# Patient Record
(demographics unavailable — no encounter records)

---

## 2024-11-13 NOTE — ASSESSMENT
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 Dr. Edil Amaya given by Hematology- control any side of bleeding during or after procedure.- Patient will have on hand.

## 2024-11-13 NOTE — HISTORY OF PRESENT ILLNESS
[No Pertinent Cardiac History] : no history of aortic stenosis, atrial fibrillation, coronary artery disease, recent myocardial infarction, or implantable device/pacemaker [No Pertinent Pulmonary History] : no history of asthma, COPD, sleep apnea, or smoking [No Adverse Anesthesia Reaction] : no adverse anesthesia reaction in self or family member [(Patient denies any chest pain, claudication, dyspnea on exertion, orthopnea, palpitations or syncope)] : Patient denies any chest pain, claudication, dyspnea on exertion, orthopnea, palpitations or syncope [Aortic Stenosis] : no aortic stenosis [Atrial Fibrillation] : no atrial fibrillation [FreeTextEntry1] : arthroscopy of right ankle [FreeTextEntry2] : 11/21/24 [FreeTextEntry3] : Dr. Flannery [FreeTextEntry4] : Here for preop clearance Good exercise tolerance.

## 2025-02-06 NOTE — PLAN
[FreeTextEntry1] : POCT Flu A over 4 days of symptoms Rec supportive care, plenty of fluids.  Follow up in 1 week if symptoms worse.

## 2025-02-06 NOTE — PHYSICAL EXAM
[No Acute Distress] : no acute distress [Well Nourished] : well nourished [Well Developed] : well developed [Well-Appearing] : well-appearing [Normal Sclera/Conjunctiva] : normal sclera/conjunctiva [PERRL] : pupils equal round and reactive to light [EOMI] : extraocular movements intact [Normal Outer Ear/Nose] : the outer ears and nose were normal in appearance [No JVD] : no jugular venous distention [No Lymphadenopathy] : no lymphadenopathy [Supple] : supple [Thyroid Normal, No Nodules] : the thyroid was normal and there were no nodules present [No Respiratory Distress] : no respiratory distress  [No Accessory Muscle Use] : no accessory muscle use [Clear to Auscultation] : lungs were clear to auscultation bilaterally [Normal Rate] : normal rate  [Regular Rhythm] : with a regular rhythm [Normal S1, S2] : normal S1 and S2 [No Murmur] : no murmur heard [No Carotid Bruits] : no carotid bruits [No Abdominal Bruit] : a ~M bruit was not heard ~T in the abdomen [No Varicosities] : no varicosities [Pedal Pulses Present] : the pedal pulses are present [No Edema] : there was no peripheral edema [No Palpable Aorta] : no palpable aorta [No Extremity Clubbing/Cyanosis] : no extremity clubbing/cyanosis [Soft] : abdomen soft [Non Tender] : non-tender [Non-distended] : non-distended [No Masses] : no abdominal mass palpated [No HSM] : no HSM [Normal Bowel Sounds] : normal bowel sounds [Normal Posterior Cervical Nodes] : no posterior cervical lymphadenopathy [Normal Anterior Cervical Nodes] : no anterior cervical lymphadenopathy [No CVA Tenderness] : no CVA  tenderness [No Spinal Tenderness] : no spinal tenderness [No Joint Swelling] : no joint swelling [Grossly Normal Strength/Tone] : grossly normal strength/tone [No Rash] : no rash [Coordination Grossly Intact] : coordination grossly intact [No Focal Deficits] : no focal deficits [Normal Gait] : normal gait [Deep Tendon Reflexes (DTR)] : deep tendon reflexes were 2+ and symmetric [Normal Affect] : the affect was normal [Normal Insight/Judgement] : insight and judgment were intact [de-identified] : minimal erythema

## 2025-02-06 NOTE — REVIEW OF SYSTEMS
[Fever] : fever [Sore Throat] : sore throat [Cough] : cough [Negative] : Heme/Lymph [Fatigue] : fatigue

## 2025-02-06 NOTE — HISTORY OF PRESENT ILLNESS
[FreeTextEntry8] : 34yr old male seen today for acute visit for 4-5 days of body aches, fever, dry cough. Child in  with similar symptoms, which have now resolved. He has been on OTC decongestants with mild relief. No fever this am. Relatively low appetite.

## 2025-05-01 NOTE — HISTORY OF PRESENT ILLNESS
[de-identified] : Wm Brooks is a 34 year old male with history of rhinoplasty, adenoidectomy, bilateral tympanostomy tube placement and cholesteatoma removal referred by Dr. Polk for evaluation of foreign body of right ear. He states that he has had mild constant right nonpuslsatile tinnitus for the past week. He went to PCP today and was found to have foreign body in right ear. He endorses Q-tip use. He denies hearing loss or vertigo. He denies otalgia or otorrhea. He denies recurrent ear infections or fever. He states grandparents with hearing loss. He denies significant loud noise exposure or ototoxic drug exposure. He denies head trauma.  He denies facial weakness or numbness.

## 2025-05-01 NOTE — CONSULT LETTER
[Dear  ___] : Dear  [unfilled], [Consult Letter:] : I had the pleasure of evaluating your patient, [unfilled]. [Please see my note below.] : Please see my note below. [Consult Closing:] : Thank you very much for allowing me to participate in the care of this patient.  If you have any questions, please do not hesitate to contact me. [Sincerely,] : Sincerely, [FreeTextEntry3] : Russell Reno MD

## 2025-05-01 NOTE — ASSESSMENT
[FreeTextEntry1] : Wm Brooks presents for evaluation of tinnitus of right ear. He has history of tympanostomy tube placement and right mastoidectomy for cholesteatoma. Otoscopic exam today reveals tympanosclerosis of right tympanic membrane. Audiogram was reviewed showing type A tymp AD, type AD tymp AS, Hearing -8000 hz AU. Will start flonase for possible eustachian tube dysfunction leading to his symptoms.  - start flonase 2 sprays BID x 1 mo - f/u if tinnitus persists or worsens over next few months

## 2025-05-01 NOTE — ASSESSMENT
[FreeTextEntry1] : Tinnitus-to ENT for evaluation may be eustachian tube dysfunction may be foreign object.  Reevaluate after seeing ENT

## 2025-05-01 NOTE — PHYSICAL EXAM
[Normal] : normal gait, coordination grossly intact, no focal deficits and deep tendon reflexes were 2+ and symmetric [de-identified] : Questionable white foreign object in right tympanic membrane

## 2025-05-01 NOTE — PHYSICAL EXAM
[Midline] : trachea located in midline position [Normal] : temporomandibular joint is normal [] : septum deviated to the right [de-identified] : Right postauricular scar [de-identified] : Right TM with tympanosclerosis. Left TM intact with no effusion.